# Patient Record
Sex: MALE | Race: BLACK OR AFRICAN AMERICAN
[De-identification: names, ages, dates, MRNs, and addresses within clinical notes are randomized per-mention and may not be internally consistent; named-entity substitution may affect disease eponyms.]

---

## 2018-05-22 ENCOUNTER — HOSPITAL ENCOUNTER (OUTPATIENT)
Dept: HOSPITAL 62 - OD | Age: 33
End: 2018-05-22
Attending: OTOLARYNGOLOGY
Payer: COMMERCIAL

## 2018-05-22 DIAGNOSIS — J30.9: Primary | ICD-10-CM

## 2018-05-22 PROCEDURE — 82785 ASSAY OF IGE: CPT

## 2018-05-22 PROCEDURE — 86003 ALLG SPEC IGE CRUDE XTRC EA: CPT

## 2018-05-22 PROCEDURE — 36415 COLL VENOUS BLD VENIPUNCTURE: CPT

## 2019-08-14 ENCOUNTER — HOSPITAL ENCOUNTER (EMERGENCY)
Dept: HOSPITAL 62 - ER | Age: 34
Discharge: HOME | End: 2019-08-14
Payer: COMMERCIAL

## 2019-08-14 VITALS — SYSTOLIC BLOOD PRESSURE: 139 MMHG | DIASTOLIC BLOOD PRESSURE: 77 MMHG

## 2019-08-14 DIAGNOSIS — I10: ICD-10-CM

## 2019-08-14 DIAGNOSIS — R07.9: ICD-10-CM

## 2019-08-14 DIAGNOSIS — F41.9: Primary | ICD-10-CM

## 2019-08-14 LAB
ADD MANUAL DIFF: NO
ALBUMIN SERPL-MCNC: 4.8 G/DL (ref 3.5–5)
ALP SERPL-CCNC: 44 U/L (ref 38–126)
ANION GAP SERPL CALC-SCNC: 9 MMOL/L (ref 5–19)
AST SERPL-CCNC: 19 U/L (ref 17–59)
BASOPHILS # BLD AUTO: 0 10^3/UL (ref 0–0.2)
BASOPHILS NFR BLD AUTO: 0.3 % (ref 0–2)
BILIRUB DIRECT SERPL-MCNC: 0.2 MG/DL (ref 0–0.4)
BILIRUB SERPL-MCNC: 0.8 MG/DL (ref 0.2–1.3)
BUN SERPL-MCNC: 11 MG/DL (ref 7–20)
CALCIUM: 10 MG/DL (ref 8.4–10.2)
CHLORIDE SERPL-SCNC: 102 MMOL/L (ref 98–107)
CO2 SERPL-SCNC: 29 MMOL/L (ref 22–30)
EOSINOPHIL # BLD AUTO: 0.2 10^3/UL (ref 0–0.6)
EOSINOPHIL NFR BLD AUTO: 2.2 % (ref 0–6)
ERYTHROCYTE [DISTWIDTH] IN BLOOD BY AUTOMATED COUNT: 12.4 % (ref 11.5–14)
GLUCOSE SERPL-MCNC: 106 MG/DL (ref 75–110)
HCT VFR BLD CALC: 43.6 % (ref 37.9–51)
HGB BLD-MCNC: 15 G/DL (ref 13.5–17)
LYMPHOCYTES # BLD AUTO: 4.6 10^3/UL (ref 0.5–4.7)
LYMPHOCYTES NFR BLD AUTO: 47.9 % (ref 13–45)
MCH RBC QN AUTO: 27.8 PG (ref 27–33.4)
MCHC RBC AUTO-ENTMCNC: 34.5 G/DL (ref 32–36)
MCV RBC AUTO: 80 FL (ref 80–97)
MONOCYTES # BLD AUTO: 0.9 10^3/UL (ref 0.1–1.4)
MONOCYTES NFR BLD AUTO: 8.9 % (ref 3–13)
NEUTROPHILS # BLD AUTO: 4 10^3/UL (ref 1.7–8.2)
NEUTS SEG NFR BLD AUTO: 40.7 % (ref 42–78)
PLATELET # BLD: 259 10^3/UL (ref 150–450)
POTASSIUM SERPL-SCNC: 4 MMOL/L (ref 3.6–5)
PROT SERPL-MCNC: 7.6 G/DL (ref 6.3–8.2)
RBC # BLD AUTO: 5.42 10^6/UL (ref 4.35–5.55)
TOTAL CELLS COUNTED % (AUTO): 100 %
WBC # BLD AUTO: 9.7 10^3/UL (ref 4–10.5)

## 2019-08-14 PROCEDURE — 85025 COMPLETE CBC W/AUTO DIFF WBC: CPT

## 2019-08-14 PROCEDURE — 80053 COMPREHEN METABOLIC PANEL: CPT

## 2019-08-14 PROCEDURE — 71046 X-RAY EXAM CHEST 2 VIEWS: CPT

## 2019-08-14 PROCEDURE — 93005 ELECTROCARDIOGRAM TRACING: CPT

## 2019-08-14 PROCEDURE — 84484 ASSAY OF TROPONIN QUANT: CPT

## 2019-08-14 PROCEDURE — 36415 COLL VENOUS BLD VENIPUNCTURE: CPT

## 2019-08-14 PROCEDURE — 93010 ELECTROCARDIOGRAM REPORT: CPT

## 2019-08-14 NOTE — ER DOCUMENT REPORT
ED Medical Screen (RME)





- General


Chief Complaint: Chest Tightness


Stated Complaint: CHEST TIGHTNESS


Time Seen by Provider: 08/14/19 18:56


Primary Care Provider: 


PERLA DAY DO [Primary Care Provider] - Follow up as needed


Notes: 





Patient is a 34-year-old male with a history of hypertension who presents to the

emergency department with a 2-day history of chest pain.  Patient states that 

the discomfort feels like a tightness located in the center of his chest that is

nonradiating.  Patient states that has been intermittent over the past 2 days 

and that nothing makes the pain better or worse specifically.  Patient denies 

shortness of breath, nausea, vomiting or diarrhea.  Patient states he did see 

his primary care physician Dr. Luong who stated to go to the emergency 

department if the pain persisted.  Patient states he did go for a run today and 

was able to tolerate that without an increase in pain.


TRAVEL OUTSIDE OF THE [place] IN LAST 30 DAYS: No





- Related Data


Allergies/Adverse Reactions: 


                                        





No Known Allergies Allergy (Verified 08/14/19 18:16)


   











Past Medical History





- Social History


Frequency of alcohol use: None


Drug Abuse: None





- Past Medical History


Cardiac Medical History: Reports: Hx Hypertension


Renal/ Medical History: Denies: Hx Peritoneal Dialysis


Past Surgical History: Reports: Hx Orthopedic Surgery - L knee surgery





Physical Exam





- Vital signs


Vitals: 





                                        











Temp Pulse Resp BP Pulse Ox


 


 98.2 F   81   18   133/78 H  96 


 


 08/14/19 18:25  08/14/19 18:25  08/14/19 18:25  08/14/19 18:25  08/14/19 18:25














- Respiratory


Respiratory status: No respiratory distress


Chest status: Nontender


Breath sounds: Normal


Chest palpation: Normal





- Cardiovascular


Rhythm: Regular


Heart sounds: Normal auscultation, S1 appreciated, S2 appreciated





Course





- Re-evaluation


Re-evalutation: 





08/14/19 19:03


I have greeted and performed a rapid initial assessment of this patient.  A 

comprehensive ED assessment and evaluation of the patient, analysis of test 

results and completion of the medical decision making process will be conducted 

by additional ED providers.





- Vital Signs


Vital signs: 





                                        











Temp Pulse Resp BP Pulse Ox


 


 98.2 F   81   18   133/78 H  96 


 


 08/14/19 18:25  08/14/19 18:25  08/14/19 18:25  08/14/19 18:25  08/14/19 18:25














Doctor's Discharge





- Discharge


Referrals: 


PERLA DAY DO [Primary Care Provider] - Follow up as needed

## 2019-08-14 NOTE — ER DOCUMENT REPORT
ED General





- General


Chief Complaint: Chest Tightness


Stated Complaint: CHEST TIGHTNESS


Time Seen by Provider: 19 18:56


Primary Care Provider: 


PERLA DAY DO [ASSOCIATE] - Follow up in 1 week


TRAVEL OUTSIDE OF THE [place] IN LAST 30 DAYS: No





- HPI


Notes: 





Patient is a 34-year-old male with a history of hypertension who presents to the

 emergency department with a 2-day history of chest pain.  Patient states that 

the discomfort feels like a tightness located in the center of his chest that is

 nonradiating.  Patient states that has been intermittent over the past 2 days 

and that nothing makes the pain better or worse specifically.  Patient denies 

shortness of breath, nausea, vomiting or diarrhea.  Patient states he did see 

his primary care physician Dr. Luong who stated to go to the emergency 

department if the pain persisted.  Patient states he did go for a run today and 

was able to tolerate that without an increase in pain.





Patient reports that the  on  that he may have felt slightly 

stressed and it was out in the heat and got hot and sweaty while wearing a full 

suit.  The patient reports he felt nauseated and vomited once after that but den

ied having any chest pain until later in the evening at rest.  The patient 

admits to anxiety recently and questions if the anxiety may be causing his 

discomfort.  The patient reports no exertional discomfort, rather stating it is 

worse at rest.





Family history no cardiac disease.





Patient does have history of hypertension and takes lisinopril.





Patient is a non-smoker.  Patient denies any pleuritic chest discomfort.  No 

cough, congestion, belching, abdominal pain, constipation, diarrhea, dysuria.














- Related Data


Allergies/Adverse Reactions: 


                                        





No Known Allergies Allergy (Verified 19 18:16)


   











Past Medical History





- General


Information source: Patient





- Social History


Smoking Status: Never Smoker


Frequency of alcohol use: None


Drug Abuse: None


Lives with: Family


Family History: None


Patient has suicidal ideation: No


Patient has homicidal ideation: No





- Past Medical History


Cardiac Medical History: Reports: Hx Hypertension


Renal/ Medical History: Denies: Hx Peritoneal Dialysis


Past Surgical History: Reports: Hx Orthopedic Surgery - L knee surgery





Review of Systems





- Review of Systems


-: Yes All other systems reviewed and negative





Physical Exam





- Vital signs


Vitals: 


                                        











Temp Pulse Resp BP Pulse Ox


 


 98.2 F   81   18   133/78 H  96 


 


 19 18:25  19 18:25  19 18:25  19 18:25  19 18:25














- Notes


Notes: 





PHYSICAL EXAMINATION:





GENERAL: Well-appearing, well-nourished and in no acute distress.





HEAD: Atraumatic, normocephalic.





EYES: Pupils equal round and reactive to light, extraocular movements intact, 

sclera anicteric, conjunctiva are normal.





ENT: Nares patent, oropharynx clear without exudates.  Moist mucous membranes.





NECK: Normal range of motion, supple without lymphadenopathy





LUNGS: Breath sounds clear to auscultation bilaterally and equal.  No wheezes 

rales or rhonchi.





HEART: Regular rate and rhythm without murmurs.  Mildly reproducible anterior 

chest wall pain.





ABDOMEN: Soft, nontender, nondistended abdomen.  No guarding, no rebound.  No 

masses appreciated.  Negative Cox's.  





Musculoskeletal: Normal range of motion, no pitting or edema.  No cyanosis.  Ne

gative Homans.  No palpable cord.





NEUROLOGICAL: Cranial nerves grossly intact.  Normal speech, normal gait.  

Normal sensory, motor exams 





PSYCH:   normal affect.  Slightly anxious.





SKIN: Warm, Dry, normal turgor, no rashes or lesions noted.





Course





- Re-evaluation


Re-evalutation: 





19 23:14


Patient initially was agreeable to taking Xanax, but then he stated he felt 

better and would feel more appropriate taking a mild muscle relaxer.  The 

patient also reported that several years ago he had a panic attack and had 

received Xanax in the past.





Patient is low risk stratification for iliac disease.  There is no evidence for 

acute MI or ischemia or pneumonia or congestive heart failure or clinical 

suggestion for pulmonary embolus given that the discomfort goes away, sometimes 

with exercise.  No evidence for anemia or diabetes or uncontrolled hypertension.





- Vital Signs


Vital signs: 


                                        











Temp Pulse Resp BP Pulse Ox


 


 98.2 F   81   18   133/78 H  96 


 


 19 18:25  19 18:25  19 18:25  19 18:25  19 18:25














- Laboratory


Result Diagrams: 


                                 19 19:08





                                 19 19:08


Laboratory results interpreted by me: 


                                        











  19





  19:08


 


Seg Neutrophils %  40.7 L


 


Lymphocytes %  47.9 H














- EKG Interpretation by Me


EKG shows normal: Sinus rhythm


Rate: Normal


Additional EKG results interpreted by me: 





19 23:01


EKG is interpreted by me showed normal sinus rhythm heart rate of 76.  There is 

no gross evidence for acute MI or ischemia noted.  No old EKG available for 

comparison.





Discharge





- Discharge


Clinical Impression: 


 Anxiety





Chest pain


Qualifiers:


 Chest pain type: unspecified Qualified Code(s): R07.9 - Chest pain, unspecified





Condition: Stable


Disposition: HOME, SELF-CARE


Instructions:  Anxiety ([facility]), Chest Pain of Unclear Cause ([facility])


Additional Instructions: 


If chest pain returns, then you need to follow-up with cardiology for stress 

testing and further evaluation.


Prescriptions: 


Methocarbamol [Robaxin 500 mg Tablet] 500 mg PO Q6HP PRN #20 tablet


 PRN Reason: 


Alprazolam [Xanax 0.5 mg Tablet] 0.5 mg PO Q12HP PRN #14 tablet


 PRN Reason: 


Forms:  Return to Work


Referrals: 


PERLA DAY DO [ASSOCIATE] - Follow up in 1 week

## 2019-08-14 NOTE — RADIOLOGY REPORT (SQ)
EXAM DESCRIPTION:  CHEST 2 VIEWS



COMPLETED DATE/TIME:  8/14/2019 7:27 pm



REASON FOR STUDY:  chest pain



COMPARISON:  None.



EXAM PARAMETERS:  NUMBER OF VIEWS: two views

TECHNIQUE: Digital Frontal and Lateral radiographic views of the chest acquired.

RADIATION DOSE: NA

LIMITATIONS: none



FINDINGS:  LUNGS AND PLEURA: No opacities, masses or pneumothorax. No pleural effusion.

MEDIASTINUM AND HILAR STRUCTURES: No masses or contour abnormalities.

HEART AND VASCULAR STRUCTURES: Heart normal size.  No evidence for failure.

BONES: No acute findings.

HARDWARE: None in the chest.

OTHER: No other significant finding.



IMPRESSION:  NO ACUTE RADIOGRAPHIC FINDING IN THE CHEST.



TECHNICAL DOCUMENTATION:  JOB ID:  [id number]

 2011 [employer]- All Rights Reserved



Reading location - IP/workstation name: [email or web address]